# Patient Record
Sex: MALE | Race: WHITE | NOT HISPANIC OR LATINO | Employment: FULL TIME | URBAN - METROPOLITAN AREA
[De-identification: names, ages, dates, MRNs, and addresses within clinical notes are randomized per-mention and may not be internally consistent; named-entity substitution may affect disease eponyms.]

---

## 2021-05-14 ENCOUNTER — HOSPITAL ENCOUNTER (EMERGENCY)
Facility: MEDICAL CENTER | Age: 31
End: 2021-05-15
Attending: EMERGENCY MEDICINE
Payer: COMMERCIAL

## 2021-05-14 DIAGNOSIS — S61.217A LACERATION OF LEFT LITTLE FINGER WITHOUT FOREIGN BODY WITHOUT DAMAGE TO NAIL, INITIAL ENCOUNTER: ICD-10-CM

## 2021-05-14 PROCEDURE — 304217 HCHG IRRIGATION SYSTEM

## 2021-05-14 PROCEDURE — 303747 HCHG EXTRA SUTURE

## 2021-05-14 PROCEDURE — 304999 HCHG REPAIR-SIMPLE/INTERMED LEVEL 1

## 2021-05-14 PROCEDURE — 90715 TDAP VACCINE 7 YRS/> IM: CPT | Performed by: EMERGENCY MEDICINE

## 2021-05-14 PROCEDURE — 90471 IMMUNIZATION ADMIN: CPT

## 2021-05-14 PROCEDURE — 700111 HCHG RX REV CODE 636 W/ 250 OVERRIDE (IP): Performed by: EMERGENCY MEDICINE

## 2021-05-14 PROCEDURE — 99283 EMERGENCY DEPT VISIT LOW MDM: CPT

## 2021-05-14 PROCEDURE — 700101 HCHG RX REV CODE 250: Performed by: EMERGENCY MEDICINE

## 2021-05-14 RX ORDER — LIDOCAINE HYDROCHLORIDE 10 MG/ML
20 INJECTION, SOLUTION INFILTRATION; PERINEURAL ONCE
Status: COMPLETED | OUTPATIENT
Start: 2021-05-14 | End: 2021-05-14

## 2021-05-14 RX ADMIN — LIDOCAINE HYDROCHLORIDE 20 ML: 10 INJECTION, SOLUTION INFILTRATION; PERINEURAL at 23:15

## 2021-05-14 RX ADMIN — CLOSTRIDIUM TETANI TOXOID ANTIGEN (FORMALDEHYDE INACTIVATED), CORYNEBACTERIUM DIPHTHERIAE TOXOID ANTIGEN (FORMALDEHYDE INACTIVATED), BORDETELLA PERTUSSIS TOXOID ANTIGEN (GLUTARALDEHYDE INACTIVATED), BORDETELLA PERTUSSIS FILAMENTOUS HEMAGGLUTININ ANTIGEN (FORMALDEHYDE INACTIVATED), BORDETELLA PERTUSSIS PERTACTIN ANTIGEN, AND BORDETELLA PERTUSSIS FIMBRIAE 2/3 ANTIGEN 0.5 ML: 5; 2; 2.5; 5; 3; 5 INJECTION, SUSPENSION INTRAMUSCULAR at 23:15

## 2021-05-14 NOTE — LETTER
"  FORM C-4:  EMPLOYEE’S CLAIM FOR COMPENSATION/ REPORT OF INITIAL TREATMENT  EMPLOYEE’S CLAIM - PROVIDE ALL INFORMATION REQUESTED   First Name Nathan Last Name Win Birthdate 1990  Sex male Claim Number   Home Address [address]             [place] [address]                                   Age  31 y.o. 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 (if applicable) mechanical work    How did this injury or occupational disease occur? Be specific and answer in detail. Use additional sheet if necessary)  blade cut   If you believe that you have an occupational disease, when did you first have knowledge of the disability and it relationship to your employment? N/A Witnesses to the Accident  N/A   Nature of Injury or Occupational Disease  Workers' Compensation Part(s) of Body Injured or Affected  Finger (L), ,     I CERTIFY THAT THE ABOVE IS TRUE AND CORRECT TO THE BEST OF MY KNOWLEDGE AND THAT I HAVE PROVIDED THIS INFORMATION IN ORDER TO OBTAIN THE BENEFITS OF [place] OR [place]).  I HEREBY AUTHORIZE ANY " PHYSICIAN, CHIROPRACTOR, SURGEON, PRACTITIONER, OR OTHER PERSON, ANY HOSPITAL, INCLUDING [facility] OR [facility], ANY MEDICAL SERVICE ORGANIZATION, ANY INSURANCE COMPANY, OR OTHER INSTITUTION OR ORGANIZATION TO RELEASE TO EACH OTHER, ANY MEDICAL OR OTHER INFORMATION, INCLUDING BENEFITS PAID OR PAYABLE, PERTINENT TO THIS INJURY OR DISEASE, EXCEPT INFORMATION RELATIVE TO DIAGNOSIS, TREATMENT AND/OR COUNSELING FOR AIDS, PSYCHOLOGICAL CONDITIONS, ALCOHOL OR CONTROLLED SUBSTANCES, FOR WHICH I MUST GIVE SPECIFIC AUTHORIZATION.  A PHOTOSTAT OF THIS AUTHORIZATION SHALL BE AS VALID AS THE ORIGINAL.  Date                                      Place                                                                             Employee’s Signature   THIS REPORT MUST BE COMPLETED AND MAILED WITHIN 3 WORKING DAYS OF TREATMENT   Place [facility], EMERGENCY DEPT                       Name of Facility [facility]   Date  5/14/2021 Diagnosis  (S61.217A) Laceration of left little finger without foreign body without damage to nail, initial encounter Is there evidence the injured employee was under the influence of alcohol and/or another controlled substance at the time of accident?   Hour  11:53 PM Description of Injury or Disease  Laceration of left little finger without foreign body without damage to nail, initial encounter No   Treatment  Clean wound and sutures, tetanus  Have you advised the patient to remain off work five days or more?         No   X-Ray Findings    If Yes   From Date    To Date      From information given by the employee, together with medical evidence, can you directly connect this injury or occupational disease as job incurred? Yes If No, is employee capable of: Full Duty  No Modified Duty  Yes   Is additional medical care by a physician indicated? Yes If Modified Duty, Specify any Limitations / Restrictions   Limited use of left hand until  "sutures removed   Do you know of any previous injury or disease contributing to this condition or occupational disease? No    Date 5/14/2021 Print Doctor’s Name Roxie Yang I certify the employer’s copy of this form was mailed on:   Address [address] [id number] [phone] INSURER’S USE ONLY   Provider’s Tax ID Number [id number] Telephone Dept: [phone]    Doctor’s Signature ponce-ROXIE Page M.D. Degree  MD      Form C-4 (rev.10/07)                                                                         BRIEF DESCRIPTION OF RIGHTS AND BENEFITS  (Pursuant to [place] 616[place].050)    Notice of Injury or Occupational Disease (Incident Report Form C-1): If an injury or occupational disease (OD) arises out of and in the course of employment, you must provide written notice to your employer as soon as practicable, but no later than 7 days after the accident or OD. Your employer shall maintain a sufficient supply of the required forms.    Claim for Compensation (Form C-4): If medical treatment is sought, the form C-4 is available at the place of initial treatment. A completed \"Claim for Compensation\" (Form C-4) must be filed within 90 days after an accident or OD. The treating physician or chiropractor must, within 3 working days after treatment, complete and mail to the employer, the employer's insurer and third-party , the Claim for Compensation.    Medical Treatment: If you require medical treatment for your on-the-job injury or OD, you may be required to select a physician or chiropractor from a list provided by your workers’ compensation insurer, if it has contracted with an Organization for Managed Care (MCO) or Preferred Provider Organization (PPO) or providers of health care. If your employer has not entered into a contract with an MCO or PPO, you may select a physician or chiropractor from the Panel of Physicians and Chiropractors. Any medical costs related to your industrial injury " or OD will be paid by your insurer.    Temporary Total Disability (TTD): If your doctor has certified that you are unable to work for a period of at least 5 consecutive days, or 5 cumulative days in a 20-day period, or places restrictions on you that your employer does not accommodate, you may be entitled to TTD compensation.    Temporary Partial Disability (TPD): If the wage you receive upon reemployment is less than the compensation for TTD to which you are entitled, the insurer may be required to pay you TPD compensation to make up the difference. TPD can only be paid for a maximum of 24 months.    Permanent Partial Disability (PPD): When your medical condition is stable and there is an indication of a PPD as a result of your injury or OD, within 30 days, your insurer must arrange for an evaluation by a rating physician or chiropractor to determine the degree of your PPD. The amount of your PPD award depends on the date of injury, the results of the PPD evaluation, your age and wage.    Permanent Total Disability (PTD): If you are medically certified by a treating physician or chiropractor as permanently and totally disabled and have been granted a PTD status by your insurer, you are entitled to receive monthly benefits not to exceed 66 2/3% of your average monthly wage. The amount of your PTD payments is subject to reduction if you previously received a lump-sum PPD award.    Vocational Rehabilitation Services: You may be eligible for vocational rehabilitation services if you are unable to return to the job due to a permanent physical impairment or permanent restrictions as a result of your injury or occupational disease.    Transportation and Per Miladis Reimbursement: You may be eligible for travel expenses and per miladis associated with medical treatment.    Reopening: You may be able to reopen your claim if your condition worsens after claim closure.     Appeal Process: If you disagree with a written  determination issued by the insurer or the insurer does not respond to your request, you may appeal to the Department of Administration, , by following the instructions contained in your determination letter. You must appeal the determination within 70 days from the date of the determination letter at [address], [address], [place], [place] [address], or [address], [address], [place], [place] [address]. If you disagree with the  decision, you may appeal to the Department of Administration, . You must file your appeal within 30 days from the date of the  decision letter at [address], [address], [place], [place] [address], or [address], [address], [place], [place] [address]. If you disagree with a decision of an , you may file a petition for judicial review with the District Court. You must do so within 30 days of the Appeal Officer’s decision. You may be represented by an  at your own expense or you may contact the [facility] for possible representation.    [place]  for Injured Workers (NAIW): If you disagree with a  decision, you may request that NAIW represent you without charge at an  Hearing. For information regarding denial of benefits, you may contact the [facility] at: [address], [address], [place] [address], [phone], or [address], [address], [place] [address], [phone]    To File a Complaint with the Division: If you wish to file a complaint with the  of the Division of Industrial Relations (DIR),  please contact the Workers’ Compensation Section, [address], [address], [place], [place] [address], telephone [phone], or [address], [address], [place], [place] [address], telephone [phone].    For assistance with Workers’ Compensation Issues: You may contact the [facility]  Office for Consumer Health Assistance, [address] [place] [address], [address], [place] [address], Toll Free 1-156.313.6969, Web site: http://[facility].[place].gov/Programs/IWONA E-mail: iwona@[facility].[place].gov  D-2 (rev. 10/20)              __________________________________________________________________                                    _________________            Employee Name / Signature                                                                                                                            Date

## 2021-05-15 ENCOUNTER — NON-PROVIDER VISIT (OUTPATIENT)
Dept: OCCUPATIONAL MEDICINE | Facility: CLINIC | Age: 31
End: 2021-05-15

## 2021-05-15 VITALS
WEIGHT: 174.16 LBS | SYSTOLIC BLOOD PRESSURE: 121 MMHG | BODY MASS INDEX: 26.4 KG/M2 | HEIGHT: 68 IN | DIASTOLIC BLOOD PRESSURE: 74 MMHG | TEMPERATURE: 98.2 F | RESPIRATION RATE: 18 BRPM | OXYGEN SATURATION: 98 % | HEART RATE: 74 BPM

## 2021-05-15 DIAGNOSIS — Z02.1 PRE-EMPLOYMENT DRUG SCREENING: ICD-10-CM

## 2021-05-15 DIAGNOSIS — Z02.83 ENCOUNTER FOR DRUG SCREENING: ICD-10-CM

## 2021-05-15 PROCEDURE — 82075 ASSAY OF BREATH ETHANOL: CPT | Performed by: PREVENTIVE MEDICINE

## 2021-05-15 PROCEDURE — 80305 DRUG TEST PRSMV DIR OPT OBS: CPT | Performed by: PREVENTIVE MEDICINE

## 2021-05-15 PROCEDURE — 8899 PR URINE 11 PANEL - AFTER HOURS: Performed by: PREVENTIVE MEDICINE

## 2021-05-15 PROCEDURE — 8911 PR MRO FEE: Performed by: NURSE PRACTITIONER

## 2021-05-15 NOTE — DISCHARGE INSTRUCTIONS
The stitches should be removed and 7 days.  Wear the finger splint for the next few days while the laceration heals.  Keep the wound clean with soap and water but do not soak.  You could apply Neosporin to the wound.  Any redness drainage pain or concerns return.

## 2021-05-15 NOTE — ED NOTES
Splint applied to finger, Pt tolerated well, at-home care education provided, Pt verbalized understanding;

## 2021-05-15 NOTE — ED PROVIDER NOTES
"ED Provider Note  CHIEF COMPLAINT  Laceration    HPI  Nathan Walden is a 31 y.o. male who presents with complaint of a laceration on the which occurred just prior to arrival.The patient sustained this injury by a . Tetanus vaccination status reviewed and is not up-to-date.  Patient denies any numbness or tingling to the finger.  No problems moving the finger.  Patient is not on blood thinners.  Patient does not have diabetes.  Patient is not concerned about fracture.  Denies foreign body.    REVIEW OF SYSTEMS  See HPI for further details. All other systems are negative.     PAST MEDICAL HISTORY   has a past medical history of Patient denies medical problems.    SOCIAL HISTORY  Social History     Tobacco Use   • Smoking status: Never Smoker   • Smokeless tobacco: Never Used   Substance and Sexual Activity   • Alcohol use: Not Currently   • Drug use: Yes     Comment: CBD   • Sexual activity: Not on file       SURGICAL HISTORY  patient denies any surgical history    CURRENT MEDICATIONS  Reviewed.  See Encounter Summary.      ALLERGIES  No Known Allergies    PHYSICAL EXAM  VITAL SIGNS: /83   Pulse 70   Temp 36.5 °C (97.7 °F) (Temporal)   Resp 18   Ht 1.727 m (5' 8\")   Wt 79 kg (174 lb 2.6 oz)   SpO2 97%   BMI 26.48 kg/m²   Constitutional: Alert in no apparent distress.  HENT: Normocephalic, Atraumatic, Bilateral external ears normal. Nose normal.   Eyes: Pupils are equal and reactive. Conjunctiva normal, non-icteric.   Thorax & Lungs: Easy unlabored respirations  Abdomen:  No gross signs of peritonitis no pain with movement   Skin: On the dorsum of the fifth digit of the left hand there is a diagonal 3 cm laceration to the finger.  The laceration is into the subcu tissue.  There is no evidence of a tendon laceration.  There is no evidence of joint involvement.  There is no foreign body.  Wound was examined under a bloodless field.  Patient has normal range of motion.  Patient has intact " sensation.   Extremities:  Neurovascular and tendon structures are intact.  Neurologic: Alert, Grossly non-focal.   Psychiatric: Affect and Mood normal      COURSE & MEDICAL DECISION MAKING  Pertinent Labs & Imaging studies reviewed. (See chart for details)    Laceration Repair Procedure Note    Indication: Laceration    Procedure: The patient was placed in the appropriate position and anesthesia around the laceration was obtained by infiltration using 1% Lidocaine without epinephrine. The area was then irrigated with normal saline.  The laceration was viewed in a bloodless field with full range of motion and no tendon laceration or foreign bodies were visualized. The laceration was closed with 5-0 Ethilon using interrupted sutures. There were no additional lacerations requiring repair. The wound area was then dressed with a bandage.      Total repaired wound length: 3 cm.     Other Items: Suture count: 4    The patient tolerated the procedure well.    Complications: None        [facility] The patient needs to return immediately if there is any redness pus or drainage or signs of infection otherwise they should follow the wound care information sheet     FINAL IMPRESSION  1. Laceration of left little finger without foreign body without damage to nail, initial encounter          The patient was discharged home with an information sheet on laceration care and told to return immediately for any signs or symptoms listed, but specifically if any redness, drainage, pus or wound opening.  The follow-up plan is documented in EPIC and provided in writing to the patient.    Electronically signed by: Roxie Yang M.D., 5/14/2021 10:58 PM

## 2021-05-19 LAB
AMP AMPHETAMINE: NORMAL
BAR BARBITURATES: NORMAL
BREATH ALCOHOL COMMENT: NORMAL
BZO BENZODIAZEPINES: NORMAL
COC COCAINE: NORMAL
INT CON NEG: NORMAL
INT CON POS: NORMAL
MDMA ECSTASY: NORMAL
MET METHAMPHETAMINES: NORMAL
MTD METHADONE: NORMAL
OPI OPIATES: NORMAL
OXY OXYCODONE: NORMAL
PCP PHENCYCLIDINE: NORMAL
POC BREATHALIZER: 0 PERCENT (ref 0–0.01)
POC URINE DRUG SCREEN OCDRS: NORMAL
THC: NORMAL

## 2021-08-14 ENCOUNTER — OFFICE VISIT (OUTPATIENT)
Dept: URGENT CARE | Facility: CLINIC | Age: 31
End: 2021-08-14
Payer: COMMERCIAL

## 2021-08-14 VITALS
SYSTOLIC BLOOD PRESSURE: 100 MMHG | HEIGHT: 68 IN | TEMPERATURE: 97.7 F | HEART RATE: 59 BPM | RESPIRATION RATE: 12 BRPM | OXYGEN SATURATION: 96 % | WEIGHT: 170 LBS | BODY MASS INDEX: 25.76 KG/M2 | DIASTOLIC BLOOD PRESSURE: 60 MMHG

## 2021-08-14 DIAGNOSIS — M54.41 ACUTE BILATERAL LOW BACK PAIN WITH BILATERAL SCIATICA: ICD-10-CM

## 2021-08-14 DIAGNOSIS — M54.42 ACUTE BILATERAL LOW BACK PAIN WITH BILATERAL SCIATICA: ICD-10-CM

## 2021-08-14 PROCEDURE — 99203 OFFICE O/P NEW LOW 30 MIN: CPT | Performed by: PHYSICIAN ASSISTANT

## 2021-08-14 RX ORDER — KETOROLAC TROMETHAMINE 30 MG/ML
30 INJECTION, SOLUTION INTRAMUSCULAR; INTRAVENOUS ONCE
Status: COMPLETED | OUTPATIENT
Start: 2021-08-14 | End: 2021-08-14

## 2021-08-14 RX ORDER — NAPROXEN 500 MG/1
500 TABLET ORAL 2 TIMES DAILY WITH MEALS
Qty: 10 TABLET | Refills: 0 | Status: SHIPPED | OUTPATIENT
Start: 2021-08-14 | End: 2021-08-19

## 2021-08-14 RX ADMIN — KETOROLAC TROMETHAMINE 30 MG: 30 INJECTION, SOLUTION INTRAMUSCULAR; INTRAVENOUS at 13:49

## 2021-08-14 ASSESSMENT — ENCOUNTER SYMPTOMS
TINGLING: 0
ABDOMINAL PAIN: 0
WEAKNESS: 0
CHILLS: 0
HEADACHES: 0
NUMBNESS: 0
PERIANAL NUMBNESS: 0
VOMITING: 0
FEVER: 0
NAUSEA: 0
WEIGHT LOSS: 0
BLURRED VISION: 0
PALPITATIONS: 0
LEG PAIN: 1
SENSORY CHANGE: 0
SHORTNESS OF BREATH: 0
BACK PAIN: 1
BOWEL INCONTINENCE: 0

## 2021-08-14 NOTE — LETTER
August 14, 2021         Patient: Nathan Walden   YOB: 1990   Date of Visit: 8/14/2021           To Whom it May Concern:    Nathan Walden was seen in my clinic on 8/14/2021.  Please excuse his absence from work on 8/14/2021 and 8/15/2021.    If you have any questions or concerns, please don't hesitate to call.        Sincerely,           Marjorie Jain P.A.-C.  Electronically Signed

## 2021-08-14 NOTE — PROGRESS NOTES
Subjective     Nathan Walden is a 31 y.o. male who presents with Low Back Pain (x2 days going into both legs)    Back Pain  This is a new problem. The current episode started in the past 7 days (started 2 days ago while patient was sleeping.  He denies any new activities.  Denies any injury.  No previous issues with back problems.). The problem occurs constantly. The problem has been waxing and waning since onset. The pain is present in the lumbar spine. The quality of the pain is described as aching. The pain radiates to the left thigh and right thigh. The pain is moderate. Exacerbated by: Patient has not noticed anything specific that aggravates it. Associated symptoms include leg pain. Pertinent negatives include no abdominal pain, bladder incontinence, bowel incontinence, chest pain, dysuria, fever, headaches, numbness, pelvic pain, perianal numbness, tingling, weakness or weight loss. Treatments tried: mild stretching. The treatment provided no relief.       Review of Systems   Constitutional: Negative for chills, fever and weight loss.   Eyes: Negative for blurred vision.   Respiratory: Negative for shortness of breath.    Cardiovascular: Negative for chest pain and palpitations.   Gastrointestinal: Negative for abdominal pain, bowel incontinence, nausea and vomiting.   Genitourinary: Negative for bladder incontinence, dysuria and pelvic pain.   Musculoskeletal: Positive for back pain.   Neurological: Negative for tingling, sensory change, weakness, numbness and headaches.       PMH:  has a past medical history of Patient denies medical problems.  MEDS:   Current Outpatient Medications:   •  naproxen (NAPROSYN) 500 MG Tab, Take 1 Tablet by mouth 2 times a day with meals for 5 days., Disp: 10 Tablet, Rfl: 0    Current Facility-Administered Medications:   •  ketorolac (TORADOL) injection 30 mg, 30 mg, Intramuscular, Once, Marjorie Jain P.A.-C.  ALLERGIES: No Known Allergies  SURGHX: History reviewed. No  "pertinent surgical history.  SOCHX:  reports that he has never smoked. He has never used smokeless tobacco. He reports previous alcohol use. He reports current drug use.  FH: Family history was reviewed, no pertinent findings to report        Objective     /60 (BP Location: Left arm, Patient Position: Sitting, BP Cuff Size: Adult)   Pulse (!) 59   Temp 36.5 °C (97.7 °F) (Temporal)   Resp 12   Ht 1.727 m (5' 8\")   Wt 77.1 kg (170 lb)   SpO2 96%   BMI 25.85 kg/m²      Physical Exam  Constitutional:       Appearance: He is well-developed.   HENT:      Head: Normocephalic and atraumatic.      Right Ear: External ear normal.      Left Ear: External ear normal.   Eyes:      Conjunctiva/sclera: Conjunctivae normal.      Pupils: Pupils are equal, round, and reactive to light.   Cardiovascular:      Rate and Rhythm: Normal rate and regular rhythm.      Heart sounds: Normal heart sounds. No murmur heard.     Pulmonary:      Effort: Pulmonary effort is normal.      Breath sounds: Normal breath sounds. No wheezing.   Musculoskeletal:      Lumbar back: Negative right straight leg raise test and negative left straight leg raise test.        Back:       Comments: Lumbar spine is exhibits mild diffuse tenderness palpation including midline tenderness.  There are no step-offs or obvious deformities noted.  No swelling.  No overlying erythema or ecchymosis.  Patient has full range of motion of lumbar spine without pain.  Negative straight leg raise test bilaterally.  Patellar DTRs are 2+ and symmetric bilaterally.  5/5 strength of lower extremities bilaterally.  Normal gait.   Skin:     General: Skin is warm and dry.      Capillary Refill: Capillary refill takes less than 2 seconds.   Neurological:      Mental Status: He is alert and oriented to person, place, and time.   Psychiatric:         Behavior: Behavior normal.         Judgment: Judgment normal.           Assessment & Plan     1. Acute bilateral low back pain " with bilateral sciatica  - ketorolac (TORADOL) injection 30 mg  - naproxen (NAPROSYN) 500 MG Tab; Take 1 Tablet by mouth 2 times a day with meals for 5 days.  Dispense: 10 Tablet; Refill: 0  Patient does have mild midline tenderness to palpation but there is no inciting injury and the tenderness extends into the paraspinous musculature.  Discussed that symptoms are still most likely related to lumbar strain with radicular symptoms.  Patient is treated with 30 mg Toradol in the office.  He was also prescribed 500 mg naproxen.  He was advised not to start taking the naproxen until tomorrow night.  He was also given some light stretching exercises to do.  In addition he was recommended that he alternate ice/heat to the affected area.  If there is no significant improvement in his symptoms after 5 days he should return to the urgent care for reevaluation.  Strict ER precautions discussed in the interim.            Differential Diagnosis, natural history, and supportive care discussed. Return to the Urgent Care or follow up with your PCP if symptoms fail to resolve, or for any new or worsening symptoms. Emergency room precautions discussed. Patient and/or family appears understanding of information.

## 2022-07-19 ENCOUNTER — OCCUPATIONAL MEDICINE (OUTPATIENT)
Dept: URGENT CARE | Facility: CLINIC | Age: 32
End: 2022-07-19
Payer: COMMERCIAL

## 2022-07-19 VITALS
DIASTOLIC BLOOD PRESSURE: 66 MMHG | OXYGEN SATURATION: 98 % | RESPIRATION RATE: 18 BRPM | BODY MASS INDEX: 25.76 KG/M2 | SYSTOLIC BLOOD PRESSURE: 98 MMHG | HEART RATE: 60 BPM | WEIGHT: 170 LBS | TEMPERATURE: 97.8 F | HEIGHT: 68 IN
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
## 2022-07-19 DIAGNOSIS — M25.561 CHRONIC PAIN OF BOTH KNEES: ICD-10-CM

## 2022-07-19 DIAGNOSIS — M25.562 CHRONIC PAIN OF BOTH KNEES: ICD-10-CM

## 2022-07-19 DIAGNOSIS — G89.29 CHRONIC PAIN OF BOTH KNEES: ICD-10-CM

## 2022-07-19 PROCEDURE — 99213 OFFICE O/P EST LOW 20 MIN: CPT | Performed by: FAMILY MEDICINE

## 2022-07-19 NOTE — LETTER
"EMPLOYEE’S CLAIM FOR COMPENSATION/ REPORT OF INITIAL TREATMENT  FORM C-4    EMPLOYEE’S CLAIM - PROVIDE ALL INFORMATION REQUESTED   First Name  Nathan Last Name  Win Birthdate                    1990                Sex  male Claim Number (Insurer’s Use Only)    Home Address  [address] Age  32 y.o. Height  1.727 m (5' 8\") Weight  77.1 kg (170 lb) [facility]     [facility] [address] Zip  [address] Telephone  [phone] (home)    Mailing Address  [address] [facility] Zip  [address] Primary Language Spoken  English    Insurer   Third-Party   Misc Workers Comp   Employee's Occupation (Job Title) When Injury or Occupational Disease Occurred      Employer's Name/Company Name    [employer]  Telephone  [phone]    Office Mail Address (Number and Street)   [address]  [address]    Date of Injury  4/17/2022               Hours Injury  NA Date Employer Notified  NA Last Day of Work after Injury     or Occupational Disease  7/17/2022 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 (if applicable)  N/A    How did this injury or occupational disease occur? (Be specific an answer in detail. Use additional sheet if necessary)  OVER TIME, CONSTANT STANDING WEAR AND TARE   If you believe that you have an occupational disease, when did you first have knowledge of the disability and it relationship to your employment?   Witnesses to the Accident  NA      Nature of Injury or Occupational Disease  Workers' Compensation  Part(s) of Body Injured or Affected  Knee (L), Knee (R), N/A    I certify that the above is true and correct to the best of my knowledge and that I have provided this information in order to obtain the benefits of " [place]  I hereby authorize any physician, chiropractor, surgeon, practitioner, or other person, any hospital, including [facility] or [facility], any medical service organization, any insurance company, or other institution or organization to release to each other, any medical or other information, including benefits paid or payable, pertinent to this injury or disease, except information relative to diagnosis, treatment and/or counseling for AIDS, psychological conditions, alcohol or controlled substances, for which I must give specific authorization.  A Photostat of this authorization shall be as valid as the original.     Date   Place Employee’s Original or  *Electronic Signature   THIS REPORT MUST BE COMPLETED AND MAILED WITHIN 3 WORKING DAYS OF TREATMENT   Place  [facility]  Name of Facility  [facility]   Date  7/19/2022 Diagnosis and Description of Injury or Occupational Disease  (M25.561,  M25.562,  G89.29) Chronic pain of both knees  (X50.3XXA) Overuse injury Is there evidence the injured employee was under the influence of alcohol and/or another controlled substance at the time of accident?  ? No ? Yes (if yes, please explain)    Hour  10:43 AM   Diagnoses of Chronic pain of both knees and Overuse injury were pertinent to this visit. No   Treatment  -We will restrict walking/standing to 6 hours maximum daily  -Tylenol and ibuprofen as needed for symptomatic relief  Have you advised the patient to remain off work five days or     more?    X-Ray Findings      ? Yes Indicate dates:   From   To      From information given by the employee, together with medical evidence, can        you directly connect this injury or occupational disease as job incurred?  No  Comments:Indeterminate ? No If no, is the injured employee capable of:  ? full duty  No ? modified duty  Yes   Is  "additional medical care by a physician indicated?  Yes If Modified Duty, Specify any Limitations / Restrictions  -We will restrict walking/standing to 6 hours maximum daily   Do you know of any previous injury or disease contributing to this condition or occupational disease?  ? Yes ? No (Explain if yes)                          No   Date  7/19/2022 Print Health Care Provider's   Scout Marina M.D. I certify the employer’s copy of  this form was mailed on:   Address  [address] Insurer’s Use Only     [facility] Zip  [address]    Provider’s Tax ID Number  [id number] Telephone  Dept: [phone]             Health Care Provider’s Original or Electronic Signature  e-SCOUT Sumner M.D. Degree (MD,DO, DC,PAGIULIA,APRN)   M.D.  ,      * Complete and attach Release of Information (Form C-4A) when injured employee signs C-4 Form electronically  ORIGINAL - TREATING HEALTHCARE PROVIDER PAGE 2 - INSURER/TPA PAGE 3 - EMPLOYER PAGE 4 - EMPLOYEE             Form C-4 (rev.08/21)           BRIEF DESCRIPTION OF RIGHTS AND BENEFITS  (Pursuant to [place] 616C.050)    Notice of Injury or Occupational Disease (Incident Report Form C-1): If an injury or occupational disease (OD) arises out of and in the course of employment, you must provide written notice to your employer as soon as practicable, but no later than 7 days after the accident or OD. Your employer shall maintain a sufficient supply of the required forms.    Claim for Compensation (Form C-4): If medical treatment is sought, the form C-4 is available at the place of initial treatment. A completed \"Claim for Compensation\" (Form C-4) must be filed within 90 days after an accident or OD. The treating physician or chiropractor must, within 3 working days after treatment, complete and mail to the employer, the employer's insurer and third-party , the Claim for Compensation.    Medical Treatment: If you require medical treatment for " your on-the-job injury or OD, you may be required to select a physician or chiropractor from a list provided by your workers’ compensation insurer, if it has contracted with an Organization for Managed Care (MCO) or Preferred Provider Organization (PPO) or providers of health care. If your employer has not entered into a contract with an MCO or PPO, you may select a physician or chiropractor from the Panel of Physicians and Chiropractors. Any medical costs related to your industrial injury or OD will be paid by your insurer.    Temporary Total Disability (TTD): If your doctor has certified that you are unable to work for a period of at least 5 consecutive days, or 5 cumulative days in a 20-day period, or places restrictions on you that your employer does not accommodate, you may be entitled to TTD compensation.    Temporary Partial Disability (TPD): If the wage you receive upon reemployment is less than the compensation for TTD to which you are entitled, the insurer may be required to pay you TPD compensation to make up the difference. TPD can only be paid for a maximum of 24 months.    Permanent Partial Disability (PPD): When your medical condition is stable and there is an indication of a PPD as a result of your injury or OD, within 30 days, your insurer must arrange for an evaluation by a rating physician or chiropractor to determine the degree of your PPD. The amount of your PPD award depends on the date of injury, the results of the PPD evaluation, your age and wage.    Permanent Total Disability (PTD): If you are medically certified by a treating physician or chiropractor as permanently and totally disabled and have been granted a PTD status by your insurer, you are entitled to receive monthly benefits not to exceed 66 2/3% of your average monthly wage. The amount of your PTD payments is subject to reduction if you previously received a lump-sum PPD award.    Vocational Rehabilitation Services: You may be  eligible for vocational rehabilitation services if you are unable to return to the job due to a permanent physical impairment or permanent restrictions as a result of your injury or occupational disease.    Transportation and Per Miladis Reimbursement: You may be eligible for travel expenses and per miladis associated with medical treatment.    Reopening: You may be able to reopen your claim if your condition worsens after claim closure.     Appeal Process: If you disagree with a written determination issued by the insurer or the insurer does not respond to your request, you may appeal to the Department of Administration, , by following the instructions contained in your determination letter. You must appeal the determination within 70 days from the date of the determination letter at [address], [address], [place], [place] [address], or [address], [place], [place] [address]. If you disagree with the  decision, you may appeal to the Department of Administration, . You must file your appeal within 30 days from the date of the  decision letter at [address], [address], [place], [place] [address], or [address], [address], [place], [place] [address]. If you disagree with a decision of an , you may file a petition for judicial review with the District Court. You must do so within 30 days of the Appeal Officer’s decision. You may be represented by an  at your own expense or you may contact the [facility] for possible representation.    [place]  for Injured Workers (NAIW): If you disagree with a  decision, you may request that NAIW represent you without charge at an  Hearing. For information regarding denial of benefits, you may contact the [facility] at: [address], [address], [place] [address], [phone], or [address], [facility]  [place] [address], [phone]    To File a Complaint with the Division: If you wish to file a complaint with the  of the Division of Industrial Relations (DIR),  please contact the Workers’ Compensation Section, [address], [address], [place], [place] [address], telephone [phone], or [address], [place], [place] [address], telephone [phone].    For assistance with Workers’ Compensation Issues: You may contact the [facility] Office for Consumer Health Assistance, [address], [place], [place] [address], Toll Free 1-341.139.8087, Web site: http://[facility].[place].gov/Programs/IWONA E-mail: [email or web address]              __________________________________________________________________                                    _________________            Employee Name / Signature                                                                                                                            Date                                                                                                                                                                                                                              D-2 (rev. 10/20)

## 2022-07-19 NOTE — LETTER
[facility] [place] [address]  Phone:  [phone] - Fax:  [phone]   [facility] Progress Report and Disability Certification  Date of Service: 7/19/2022   No Show:  No  Date / Time of Next Visit: 7/27/2022 AT 8 AM    Claim Information   Patient Name: Nathan Walden  Claim Number:     Employer:  [employer]  Date of Injury: 4/17/2022     Insurer / TPA: Misc Workers Comp  ID / SSN:     Occupation:  Diagnosis: Diagnoses of Chronic pain of both knees and Overuse injury were pertinent to this visit.    Medical Information   Related to Industrial Injury? No  Comments:indeterminant   Subjective Complaints:  DOI: 4/17/2022  CRISTÓBAL: No particular injury, just started feeling bilateral knee pain that day at work. He believes it is related to over use. The pain is intermittent, worse with standing long periods, it does come daily, described as both throbbing and aching, currently rated 2/10. He has been using ice with moderate relief in symptoms. Denies prior knee injury. No secondary employment.     Objective Findings: No discolorations or deformities noted to inspection of knees/lower extremities bilaterally.  Bilateral knee exam: Patella is freely mobile and nontender, not tender to palpation of joint line.  Anterior and posterior drawer negative.  Thessaly negative.  Normal gait.   Pre-Existing Condition(s):     Assessment:   Initial Visit    Status: Additional Care Required  Permanent Disability:No    Plan:      Diagnostics:      Comments:  -We will restrict walking/standing to 6 hours maximum daily  -Tylenol and ibuprofen as needed for symptomatic relief    Disability Information   Status: Released to Restricted Duty    From:  7/19/2022  Through: 7/27/2022 Restrictions are: Temporary   Physical Restrictions   Sitting:    Standing:  < or = to 6 hrs/day Stooping:    Bending:      Squatting:    Walking:  < or = to 6 hrs/day  Climbing:    Pushing:      Pulling:    Other:    Reaching Above Shoulder (L):   Reaching Above Shoulder (R):       Reaching Below Shoulder (L):    Reaching Below Shoulder (R):      Not to exceed Weight Limits   Carrying(hrs):   Weight Limit(lb):   Lifting(hrs):   Weight  Limit(lb):     Comments:      Repetitive Actions   Hands: i.e. Fine Manipulations from Grasping:     Feet: i.e. Operating Foot Controls:     Driving / Operate Machinery:     Health Care Provider’s Original or Electronic Signature  Arina Marina M.D. Health Care Provider’s Original or Electronic Signature    Jeff Lance MD         Clinic Name / Location: [address]  [place] [address] Clinic Phone Number: Dept: [phone]   Appointment Time: 10:15 Am Visit Start Time: 10:43 AM   Check-In Time:  10:37 Am Visit Discharge Time:    Original-Treating Physician or Chiropractor    Page 2-Insurer/TPA    Page 3-Employer    Page 4-Employee

## 2022-07-19 NOTE — PROGRESS NOTES
"  Subjective:     32 y.o. male presents for Knee Pain (W/C DOI: 04/17/22 both knees pain )      DOI: 4/17/2022  CRISTÓBAL: No particular injury, just started feeling bilateral knee pain that day at work. He believes it is related to over use. The pain is intermittent, worse with standing long periods, it does come daily, described as both throbbing and aching, currently rated 2/10. He has been using ice with moderate relief in symptoms. Denies prior knee injury. No secondary employment.      PMH:   No pertinent past medical history to this problem  MEDS:  Medications were reviewed in EMR  ALLERGIES:  Allergies were reviewed in EMR  SOCHX:  Works as a   FH:   No pertinent family history to this problem     Objective:     BP (!) 98/66   Pulse 60   Temp 36.6 °C (97.8 °F) (Temporal)   Resp 18   Ht 1.727 m (5' 8\")   Wt 77.1 kg (170 lb)   SpO2 98%   BMI 25.85 kg/m²     No discolorations or deformities noted to inspection of knees/lower extremities bilaterally.  Bilateral knee exam: Patella is freely mobile and nontender, not tender to palpation of joint line.  Anterior and posterior drawer negative.  Thessaly negative.  Normal gait.    Assessment/Plan:     1. Chronic pain of both knees    2. Overuse injury    • Released to Restricted Duty FROM 7/19/2022 TO 7/27/2022     -We will restrict walking/standing to 6 hours maximum daily  -Tylenol and ibuprofen as needed for symptomatic relief    Differential diagnosis, natural history, supportive care, and indications for immediate follow-up discussed.  "

## 2022-07-27 ENCOUNTER — OCCUPATIONAL MEDICINE (OUTPATIENT)
Dept: URGENT CARE | Facility: CLINIC | Age: 32
End: 2022-07-27
Payer: COMMERCIAL

## 2022-07-27 VITALS
BODY MASS INDEX: 25.76 KG/M2 | SYSTOLIC BLOOD PRESSURE: 110 MMHG | HEIGHT: 68 IN | OXYGEN SATURATION: 98 % | TEMPERATURE: 98.3 F | HEART RATE: 72 BPM | RESPIRATION RATE: 18 BRPM | WEIGHT: 170 LBS | DIASTOLIC BLOOD PRESSURE: 68 MMHG

## 2022-07-27 DIAGNOSIS — M25.561 CHRONIC PAIN OF BOTH KNEES: ICD-10-CM

## 2022-07-27 DIAGNOSIS — M25.562 CHRONIC PAIN OF BOTH KNEES: ICD-10-CM

## 2022-07-27 DIAGNOSIS — G89.29 CHRONIC PAIN OF BOTH KNEES: ICD-10-CM

## 2022-07-27 PROCEDURE — 99213 OFFICE O/P EST LOW 20 MIN: CPT | Performed by: PHYSICIAN ASSISTANT

## 2022-07-27 ASSESSMENT — ENCOUNTER SYMPTOMS
FALLS: 0
TINGLING: 0
MYALGIAS: 1
WEAKNESS: 0

## 2022-07-27 NOTE — PROGRESS NOTES
"Subjective     Nathan Walden is a 32 y.o. male who presents with Follow-Up (W/C DOI: 04/17/22 both knees pain )      HPI: From 7/19/22  DOI: 4/17/2022  CRISTÓBAL: No particular injury, just started feeling bilateral knee pain that day at work. He believes it is related to over use. The pain is intermittent, worse with standing long periods, it does come daily, described as both throbbing and aching, currently rated 2/10. He has been using ice with moderate relief in symptoms. Denies prior knee injury. No secondary employment.      HPI: Today 7/27/22  This is a 32-year-old male presented to clinic for follow-up.  Patient states he has noted no significant improvement since last visit.  Has been following work restrictions.  Describes intermittent achiness to both knees.  Worse with standing for long periods of time.  Pain is currently mild.  Currently not taking any anti-inflammatories.  No previous injury.       Review of Systems   Musculoskeletal: Positive for joint pain and myalgias. Negative for falls.   Neurological: Negative for tingling and weakness.              Objective     /68   Pulse 72   Temp 36.8 °C (98.3 °F) (Temporal)   Resp 18   Ht 1.727 m (5' 8\")   Wt 77.1 kg (170 lb)   SpO2 98%   BMI 25.85 kg/m²      Physical Exam    Constitutional: Pt is oriented to person, place, and time.  Appears well-developed and well-nourished. No distress.   Eyes: Conjunctivae are normal.   Cardiovascular: Normal rate.    Pulmonary/Chest: Effort normal.   Musculoskeletal: Bilateral knees show no obvious deformity, discoloration or effusion.  Full knee flexion and extension.  Lower extremity strength full and intact.  No knee laxity.  Normal gait.  Neurological: Pt is alert and oriented to person, place, and time. Coordination normal.   Skin: Skin is warm. Pt is not diaphoretic. No erythema.   Psychiatric: Pt has a normal mood and affect.  Behavior is normal.              Assessment & Plan        1. Chronic pain of " both knees    See above work restrictions.  Encourage starting an anti-inflammatory and taking daily with food.  May apply topical Voltaren.  Continue with stretching exercises.  Follow-up in 1 week for reevaluation on 8/3/2022.    Differential diagnosis, natural history, supportive care, and indications for immediate follow-up discussed at length.

## 2022-07-27 NOTE — LETTER
[facility] [address] [facility] [place] [address]  Phone:  [phone] - Fax:  [phone]   [facility] Progress Report and Disability Certification  Date of Service: 7/27/2022   No Show:  No  Date / Time of Next Visit: 8/3/2022   Claim Information   Patient Name: Nathan Walden  Claim Number:     Employer:  [employer]  Date of Injury: 4/17/2022     Insurer / TPA: Misc Workers Comp  ID / SSN:     Occupation:  Diagnosis: There were no encounter diagnoses.    Medical Information   Related to Industrial Injury?   Comments:?    Subjective Complaints:  HPI: From 7/19/22  DOI: 4/17/2022  CRISTÓBAL: No particular injury, just started feeling bilateral knee pain that day at work. He believes it is related to over use. The pain is intermittent, worse with standing long periods, it does come daily, described as both throbbing and aching, currently rated 2/10. He has been using ice with moderate relief in symptoms. Denies prior knee injury. No secondary employment.      HPI: Today 7/27/22  This is a 32-year-old male presented to clinic for follow-up.  Patient states he has noted no significant improvement since last visit.  Has been following work restrictions.  Describes intermittent achiness to both knees.  Worse with standing for long periods of time.  Pain is currently mild.  Currently not taking any anti-inflammatories.  No previous injury.   Objective Findings: Constitutional: Pt is oriented to person, place, and time.  Appears well-developed and well-nourished. No distress.   Eyes: Conjunctivae are normal.   Cardiovascular: Normal rate.    Pulmonary/Chest: Effort normal.   Musculoskeletal: Bilateral knees show no obvious deformity, discoloration or effusion.  Full knee flexion and extension.  Lower extremity strength full and intact.  No knee laxity.  Normal gait.  Neurological: Pt is alert and oriented to person, place, and  time. Coordination normal.   Skin: Skin is warm. Pt is not diaphoretic. No erythema.   Psychiatric: Pt has a normal mood and affect.  Behavior is normal.      Pre-Existing Condition(s):     Assessment:   Condition Same    Status: Additional Care Required  Permanent Disability:No    Plan:      Diagnostics:      Comments:       Disability Information   Status: Released to Restricted Duty    From:  7/27/2022  Through: 8/3/2022 Restrictions are: Temporary   Physical Restrictions   Sitting:    Standing:  < or = to 6 hrs/day Stooping:    Bending:      Squatting:    Walking:  < or = to 6 hrs/day Climbing:    Pushing:      Pulling:    Other:    Reaching Above Shoulder (L):   Reaching Above Shoulder (R):       Reaching Below Shoulder (L):    Reaching Below Shoulder (R):      Not to exceed Weight Limits   Carrying(hrs):   Weight Limit(lb):   Lifting(hrs):   Weight  Limit(lb):     Comments: See above work restrictions.  Encourage starting an anti-inflammatory and taking daily with food.  May apply topical Voltaren.  Continue with stretching exercises.  Follow-up in 1 week for reevaluation on 8/3/2022.    Repetitive Actions   Hands: i.e. Fine Manipulations from Grasping:     Feet: i.e. Operating Foot Controls:     Driving / Operate Machinery:     Health Care Provider’s Original or Electronic Signature  Konstantin Girard P.A.-C. Health Care Provider’s Original or Electronic Signature    Jeff Lance MD         Clinic Name / Location: [address]  [place] [address] Clinic Phone Number: Dept: [phone]   Appointment Time: 8:00 Am Visit Start Time: 8:34 AM   Check-In Time:  8:16 Am Visit Discharge Time:    Original-Treating Physician or Chiropractor    Page 2-Insurer/TPA    Page 3-Employer    Page 4-Employee

## 2023-10-26 PROBLEM — Z01.10 ENCOUNTER FOR HEARING EXAMINATION WITHOUT ABNORMAL FINDINGS: Status: ACTIVE | Noted: 2023-10-26
